# Patient Record
Sex: FEMALE | Race: WHITE | NOT HISPANIC OR LATINO | URBAN - METROPOLITAN AREA
[De-identification: names, ages, dates, MRNs, and addresses within clinical notes are randomized per-mention and may not be internally consistent; named-entity substitution may affect disease eponyms.]

---

## 2017-03-08 PROBLEM — Z00.00 ENCOUNTER FOR PREVENTIVE HEALTH EXAMINATION: Status: ACTIVE | Noted: 2017-03-08

## 2017-09-11 ENCOUNTER — INPATIENT (INPATIENT)
Facility: HOSPITAL | Age: 27
LOS: 1 days | Discharge: HOME | End: 2017-09-13
Attending: OBSTETRICS & GYNECOLOGY | Admitting: OBSTETRICS & GYNECOLOGY

## 2017-09-11 DIAGNOSIS — O13.9 GESTATIONAL [PREGNANCY-INDUCED] HYPERTENSION WITHOUT SIGNIFICANT PROTEINURIA, UNSPECIFIED TRIMESTER: ICD-10-CM

## 2017-09-15 DIAGNOSIS — Z33.1 PREGNANT STATE, INCIDENTAL: ICD-10-CM

## 2017-09-15 DIAGNOSIS — Z3A.40 40 WEEKS GESTATION OF PREGNANCY: ICD-10-CM

## 2019-01-23 ENCOUNTER — APPOINTMENT (OUTPATIENT)
Dept: ANTEPARTUM | Facility: CLINIC | Age: 29
End: 2019-01-23
Payer: MEDICAID

## 2019-01-23 ENCOUNTER — ASOB RESULT (OUTPATIENT)
Age: 29
End: 2019-01-23

## 2019-01-23 PROCEDURE — 76811 OB US DETAILED SNGL FETUS: CPT

## 2019-01-23 PROCEDURE — 76817 TRANSVAGINAL US OBSTETRIC: CPT

## 2019-05-22 ENCOUNTER — OUTPATIENT (OUTPATIENT)
Dept: OUTPATIENT SERVICES | Facility: HOSPITAL | Age: 29
LOS: 1 days | Discharge: HOME | End: 2019-05-22

## 2019-05-22 VITALS — SYSTOLIC BLOOD PRESSURE: 138 MMHG | HEART RATE: 103 BPM | DIASTOLIC BLOOD PRESSURE: 88 MMHG | TEMPERATURE: 97 F

## 2019-05-22 VITALS — DIASTOLIC BLOOD PRESSURE: 72 MMHG | SYSTOLIC BLOOD PRESSURE: 127 MMHG | HEART RATE: 75 BPM

## 2019-05-22 LAB
ALBUMIN SERPL ELPH-MCNC: 3.3 G/DL — LOW (ref 3.5–5.2)
ALP SERPL-CCNC: 130 U/L — HIGH (ref 30–115)
ALT FLD-CCNC: 18 U/L — SIGNIFICANT CHANGE UP (ref 0–41)
ANION GAP SERPL CALC-SCNC: 15 MMOL/L — HIGH (ref 7–14)
ANISOCYTOSIS BLD QL: SLIGHT — SIGNIFICANT CHANGE UP
AST SERPL-CCNC: 18 U/L — SIGNIFICANT CHANGE UP (ref 0–41)
BASOPHILS # BLD AUTO: 0 K/UL — SIGNIFICANT CHANGE UP (ref 0–0.2)
BASOPHILS NFR BLD AUTO: 0 % — SIGNIFICANT CHANGE UP (ref 0–1)
BILIRUB SERPL-MCNC: 0.2 MG/DL — SIGNIFICANT CHANGE UP (ref 0.2–1.2)
BUN SERPL-MCNC: 6 MG/DL — LOW (ref 10–20)
CALCIUM SERPL-MCNC: 9.7 MG/DL — SIGNIFICANT CHANGE UP (ref 8.5–10.1)
CHLORIDE SERPL-SCNC: 102 MMOL/L — SIGNIFICANT CHANGE UP (ref 98–110)
CO2 SERPL-SCNC: 22 MMOL/L — SIGNIFICANT CHANGE UP (ref 17–32)
CREAT ?TM UR-MCNC: 20 MG/DL — SIGNIFICANT CHANGE UP
CREAT SERPL-MCNC: <0.5 MG/DL — LOW (ref 0.7–1.5)
EOSINOPHIL # BLD AUTO: 0 K/UL — SIGNIFICANT CHANGE UP (ref 0–0.7)
EOSINOPHIL NFR BLD AUTO: 0 % — SIGNIFICANT CHANGE UP (ref 0–8)
GIANT PLATELETS BLD QL SMEAR: PRESENT — SIGNIFICANT CHANGE UP
GLUCOSE SERPL-MCNC: 91 MG/DL — SIGNIFICANT CHANGE UP (ref 70–99)
HCT VFR BLD CALC: 32.4 % — LOW (ref 37–47)
HGB BLD-MCNC: 10.2 G/DL — LOW (ref 12–16)
LDH SERPL L TO P-CCNC: 215 — SIGNIFICANT CHANGE UP (ref 50–242)
LYMPHOCYTES # BLD AUTO: 1.21 K/UL — SIGNIFICANT CHANGE UP (ref 1.2–3.4)
LYMPHOCYTES # BLD AUTO: 11.3 % — LOW (ref 20.5–51.1)
MANUAL SMEAR VERIFICATION: SIGNIFICANT CHANGE UP
MCHC RBC-ENTMCNC: 25.8 PG — LOW (ref 27–31)
MCHC RBC-ENTMCNC: 31.5 G/DL — LOW (ref 32–37)
MCV RBC AUTO: 81.8 FL — SIGNIFICANT CHANGE UP (ref 81–99)
METAMYELOCYTES # FLD: 1.7 % — HIGH (ref 0–0)
MICROCYTES BLD QL: SLIGHT — SIGNIFICANT CHANGE UP
MONOCYTES # BLD AUTO: 0.28 K/UL — SIGNIFICANT CHANGE UP (ref 0.1–0.6)
MONOCYTES NFR BLD AUTO: 2.6 % — SIGNIFICANT CHANGE UP (ref 1.7–9.3)
MYELOCYTES NFR BLD: 1.8 % — HIGH (ref 0–0)
NEUTROPHILS # BLD AUTO: 8.5 K/UL — HIGH (ref 1.4–6.5)
NEUTROPHILS NFR BLD AUTO: 79.1 % — HIGH (ref 42.2–75.2)
OVALOCYTES BLD QL SMEAR: SLIGHT — SIGNIFICANT CHANGE UP
PLAT MORPH BLD: NORMAL — SIGNIFICANT CHANGE UP
PLATELET # BLD AUTO: 148 K/UL — SIGNIFICANT CHANGE UP (ref 130–400)
POTASSIUM SERPL-MCNC: 4 MMOL/L — SIGNIFICANT CHANGE UP (ref 3.5–5)
POTASSIUM SERPL-SCNC: 4 MMOL/L — SIGNIFICANT CHANGE UP (ref 3.5–5)
PROT ?TM UR-MCNC: <5 MG/DLG/24H — SIGNIFICANT CHANGE UP
PROT SERPL-MCNC: 5.8 G/DL — LOW (ref 6–8)
PROT/CREAT UR-RTO: <0.2 RATIO — SIGNIFICANT CHANGE UP (ref 0–0.2)
RBC # BLD: 3.96 M/UL — LOW (ref 4.2–5.4)
RBC # FLD: 13.8 % — SIGNIFICANT CHANGE UP (ref 11.5–14.5)
RBC BLD AUTO: NORMAL — SIGNIFICANT CHANGE UP
SODIUM SERPL-SCNC: 139 MMOL/L — SIGNIFICANT CHANGE UP (ref 135–146)
URATE SERPL-MCNC: 3.9 MG/DL — SIGNIFICANT CHANGE UP (ref 2.5–7)
VARIANT LYMPHS # BLD: 3.5 % — SIGNIFICANT CHANGE UP (ref 0–5)
WBC # BLD: 10.75 K/UL — SIGNIFICANT CHANGE UP (ref 4.8–10.8)
WBC # FLD AUTO: 10.75 K/UL — SIGNIFICANT CHANGE UP (ref 4.8–10.8)

## 2019-05-22 NOTE — PROGRESS NOTE ADULT - SUBJECTIVE AND OBJECTIVE BOX
PGY 3 Note:    Patient seen at bedside. Feeling well. Denies ctx, vb, lof. Good FM. Denies headache, vision changes, chest pain, shortness of breath, right upper or epigastric abdominal pain, new swelling.    Physical Exam:     Vital Signs Last 24 Hrs  T(C): 37 (22 May 2019 14:13), Max: 37 (22 May 2019 14:13)  T(F): 98.6 (22 May 2019 14:13), Max: 98.6 (22 May 2019 14:13)  HR: 75 (22 May 2019 21:14) (75 - 121)  BP: 127/72 (22 May 2019 21:14) (115/71 - 142/79)  RR: 18 (22 May 2019 14:13) (18 - 18)    Gen: NAD, A&OX3  Heart: S1S2, RRR  Lungs: CTAB  Abd: gravid, sfot, NT, no palpable ctx, no RUQ/epigastric tenderness, no R/G/R  VE: Deferred  Ext: No edema or calf tenderness  Neuro: DTRs 2+ UE Bilaterally    EFM: 140/mod/+accels  [place]: occasional    Labs:                          10.2   10.75 )-----------( 148      ( 22 May 2019 15:07 )             32.4     05-22    139  |  102  |  6<L>  ----------------------------<  91  4.0   |  22  |  <0.5<L>    Ca    9.7      22 May 2019 15:07    TPro  5.8<L>  /  Alb  3.3<L>  /  TBili  0.2  /  DBili  x   /  AST  18  /  ALT  18  /  AlkPhos  130<H>  05-22    Uric Acid, Serum: 3.9 mg/dL (05.22.19 @ 15:07)    Lactate Dehydrogenase, Serum: 215 (05.22.19 @ 15:07)    UPRCR PENDING

## 2019-05-22 NOTE — OB PROVIDER TRIAGE NOTE - NS_OBGYNHISTORY_OBGYN_ALL_OB_FT
OBhx:     @ 37weeks, F, 5-10lbs--> PP GHTN, on procardia x 2 weeks    @ 40weeks, M 7-11lbs--> PP GHTN, procardia x 2 weeks  Gynhx: denies fibroids, cysts, STIs

## 2019-05-22 NOTE — OB PROVIDER TRIAGE NOTE - HISTORY OF PRESENT ILLNESS
29 yo  @ 37w4d, EDC 2019 by LMP sent in from the office for BP monitoring. BPs in office ranged from 121-150/84-92. Denies headache, blurry vision, chest pain, SOB, RUQ/epigastric pain. h/o PP GHTN after both of her previous pregnancies. Was on procardia for 2 weeks PP after last two pregnancies. BPs went back to normal after 2 weeks and procardia was discontinued. Denies h/o preeclampsia or getting magnesium. Denies ctx, VB, LOF. Good FM. No other issues during this pregnancy.

## 2019-05-22 NOTE — PROGRESS NOTE ADULT - ASSESSMENT
29 yo  @ 37w4d, GBS neg, no criteria for GHTN or preeclampsia met, spontaneous decels x 3 resolved, now Cat 1, reactive tracing, BPP 8/8, not in labor, maternal and fetal wellbeing reassured    - DC home per PMD  - Precautions given  - Uprcr pending, will follow up  - F/u with OBGYN this week      Dr. Mccormick aware 29 yo  @ 37w4d, GBS neg, no criteria for GHTN or preeclampsia met yet, spontaneous decels x 3 resolved, now Cat 1, reactive tracing, BPP 8/8, not in labor, maternal and fetal wellbeing reassured    - DC home per PMD  - Precautions given  - Uprcr pending, will follow up  - F/u with OBGYN this week      Dr. Mccormick aware

## 2019-05-22 NOTE — PROGRESS NOTE ADULT - ASSESSMENT
29 yo  @ 37w4d, GBS neg, no criteria for GHTN or preeclampsia met, spontaneous decels x 2 resolved, reactive tracing, BPP     Will discuss case with attending to decide on continued prolonged monitoring vs possible dc home    Dr. Hinton to be made aware

## 2019-05-22 NOTE — OB PROVIDER TRIAGE NOTE - NSHPPHYSICALEXAM_GEN_ALL_CORE
ICU Vital Signs Last 24 Hrs  T(C): 37 (22 May 2019 14:13), Max: 37 (22 May 2019 14:13)  T(F): 98.6 (22 May 2019 14:13), Max: 98.6 (22 May 2019 14:13)  HR: 86 (22 May 2019 14:51) (86 - 103)  BP: 135/84 (22 May 2019 14:51) (128/79 - 138/88)  BP(mean): --  ABP: --  ABP(mean): --  RR: 18 (22 May 2019 14:13) (18 - 18)  SpO2: --    GEN:NAD  Abd: NT, gravid, no palpable ctx   SVE: deferred, was c/l/p last week  Heart: RRR  Lungs: CTAB  Ext: no calf tenderness or edema b/l  Neuro: 2+ DTR in LE b/l    EFM: 140/mod earle/+accel/1 spontaneous decel x 1 min, recovered spontaneously  TOCO: irritable

## 2019-05-22 NOTE — PROGRESS NOTE ADULT - SUBJECTIVE AND OBJECTIVE BOX
PGY 3 note    Patient seen at bedside for evaluation of 2 spontaneous decels.  No headache, blurry vision, chest pain, SOB, RUQ/epigastric pain. No ctx, VB, LOF. Good FM.    ICU Vital Signs Last 24 Hrs  T(C): 37 (22 May 2019 14:13), Max: 37 (22 May 2019 14:13)  T(F): 98.6 (22 May 2019 14:13), Max: 98.6 (22 May 2019 14:13)  HR: 99 (22 May 2019 17:59) (77 - 103)  BP: 142/76 (22 May 2019 17:59) (124/77 - 142/76)  BP(mean): --  ABP: --  ABP(mean): --  RR: 18 (22 May 2019 14:13) (18 - 18)  SpO2: --    GEN: NAD  Heart: RRR  Lungs: CTAB  Abd: soft, NT, gravid, no palpable ctx, no RUQ/epigastric pain  SVE: deferred  Neuro: 2+ DTR in UE b/l    EFM: 140/mod earle/+accels/2 spontaneous decels at 1600 and 1500, since then tracing is reactive, Cat 1  TOCO q4-6 irreg    labs  5/22 10.75>10.2/32.4<148, 139/4/102/22/6/0.5<91, AST/ALT 18/18, uric acid 3.9,   Upr/cr pending    bedside sonogram:  BPP 8/8, MVP 4cm, cephalic, ant placenta, BPP 8/8, EFW 3349g (65%)

## 2019-05-22 NOTE — OB PROVIDER TRIAGE NOTE - NSOBPROVIDERNOTE_OBGYN_ALL_OB_FT
27yo  @ 37w4d, GBS neg, h/o GHTN in previous pregnancies x 2, with mildy elevated BPs in clinic, r/o GHTN vs preeclampsia, 1 spontaneous decel  f/u PEL  continuous toco./efm  if pt meets criteria or tracing becomes Cat 2 will discuss IOL  f/u BPs q 15  bedrest  regular diet    Dr. Hinton aware

## 2019-05-22 NOTE — OB PROVIDER TRIAGE NOTE - NSHPLABSRESULTS_GEN_ALL_CORE
GCT 88    12w5d +FH  19w: ant placenta, nl anatomy, AGA    A pos  rubella nonimmune  Measles non immune  Varicella immune  HIV NR  12.1/36.6  RPR NR  Heps Ag NR

## 2019-05-31 ENCOUNTER — INPATIENT (INPATIENT)
Facility: HOSPITAL | Age: 29
LOS: 1 days | Discharge: HOME | End: 2019-06-02
Attending: OBSTETRICS & GYNECOLOGY | Admitting: OBSTETRICS & GYNECOLOGY
Payer: MEDICAID

## 2019-05-31 VITALS — SYSTOLIC BLOOD PRESSURE: 135 MMHG | HEART RATE: 105 BPM | DIASTOLIC BLOOD PRESSURE: 76 MMHG

## 2019-05-31 PROBLEM — Z78.9 OTHER SPECIFIED HEALTH STATUS: Chronic | Status: ACTIVE | Noted: 2019-05-22

## 2019-05-31 LAB
AMPHET UR-MCNC: NEGATIVE — SIGNIFICANT CHANGE UP
APPEARANCE UR: ABNORMAL
BACTERIA # UR AUTO: ABNORMAL /HPF
BARBITURATES UR SCN-MCNC: NEGATIVE — SIGNIFICANT CHANGE UP
BASOPHILS # BLD AUTO: 0.03 K/UL — SIGNIFICANT CHANGE UP (ref 0–0.2)
BASOPHILS NFR BLD AUTO: 0.3 % — SIGNIFICANT CHANGE UP (ref 0–1)
BENZODIAZ UR-MCNC: NEGATIVE — SIGNIFICANT CHANGE UP
BILIRUB UR-MCNC: NEGATIVE — SIGNIFICANT CHANGE UP
BLD GP AB SCN SERPL QL: SIGNIFICANT CHANGE UP
BUPRENORPHINE SCREEN, URINE RESULT: POSITIVE
COCAINE METAB.OTHER UR-MCNC: NEGATIVE — SIGNIFICANT CHANGE UP
COLOR SPEC: YELLOW — SIGNIFICANT CHANGE UP
DIFF PNL FLD: NEGATIVE — SIGNIFICANT CHANGE UP
EOSINOPHIL # BLD AUTO: 0.02 K/UL — SIGNIFICANT CHANGE UP (ref 0–0.7)
EOSINOPHIL NFR BLD AUTO: 0.2 % — SIGNIFICANT CHANGE UP (ref 0–8)
EPI CELLS # UR: ABNORMAL /HPF
GLUCOSE UR QL: NEGATIVE MG/DL — SIGNIFICANT CHANGE UP
HCT VFR BLD CALC: 33.7 % — LOW (ref 37–47)
HGB BLD-MCNC: 10.9 G/DL — LOW (ref 12–16)
IMM GRANULOCYTES NFR BLD AUTO: 1.8 % — HIGH (ref 0.1–0.3)
KETONES UR-MCNC: NEGATIVE — SIGNIFICANT CHANGE UP
L&D DRUG SCREEN, URINE: SIGNIFICANT CHANGE UP
LEUKOCYTE ESTERASE UR-ACNC: ABNORMAL
LYMPHOCYTES # BLD AUTO: 1.49 K/UL — SIGNIFICANT CHANGE UP (ref 1.2–3.4)
LYMPHOCYTES # BLD AUTO: 13 % — LOW (ref 20.5–51.1)
MCHC RBC-ENTMCNC: 26.5 PG — LOW (ref 27–31)
MCHC RBC-ENTMCNC: 32.3 G/DL — SIGNIFICANT CHANGE UP (ref 32–37)
MCV RBC AUTO: 82 FL — SIGNIFICANT CHANGE UP (ref 81–99)
METHADONE UR-MCNC: NEGATIVE — SIGNIFICANT CHANGE UP
MONOCYTES # BLD AUTO: 0.62 K/UL — HIGH (ref 0.1–0.6)
MONOCYTES NFR BLD AUTO: 5.4 % — SIGNIFICANT CHANGE UP (ref 1.7–9.3)
NEUTROPHILS # BLD AUTO: 9.05 K/UL — HIGH (ref 1.4–6.5)
NEUTROPHILS NFR BLD AUTO: 79.3 % — HIGH (ref 42.2–75.2)
NITRITE UR-MCNC: NEGATIVE — SIGNIFICANT CHANGE UP
NRBC # BLD: 0 /100 WBCS — SIGNIFICANT CHANGE UP (ref 0–0)
OPIATES UR-MCNC: NEGATIVE — SIGNIFICANT CHANGE UP
OXYCODONE UR-MCNC: NEGATIVE — SIGNIFICANT CHANGE UP
PCP UR-MCNC: NEGATIVE — SIGNIFICANT CHANGE UP
PH UR: 8.5 — SIGNIFICANT CHANGE UP (ref 5–8)
PLATELET # BLD AUTO: 116 K/UL — LOW (ref 130–400)
PRENATAL SYPHILIS TEST: SIGNIFICANT CHANGE UP
PROPOXYPHENE QUALITATIVE URINE RESULT: NEGATIVE — SIGNIFICANT CHANGE UP
PROT UR-MCNC: 30 MG/DL
RBC # BLD: 4.11 M/UL — LOW (ref 4.2–5.4)
RBC # FLD: 14.6 % — HIGH (ref 11.5–14.5)
SP GR SPEC: 1.02 — SIGNIFICANT CHANGE UP (ref 1.01–1.03)
UROBILINOGEN FLD QL: 0.2 MG/DL — SIGNIFICANT CHANGE UP (ref 0.2–0.2)
WBC # BLD: 11.42 K/UL — HIGH (ref 4.8–10.8)
WBC # FLD AUTO: 11.42 K/UL — HIGH (ref 4.8–10.8)

## 2019-05-31 PROCEDURE — 59400 OBSTETRICAL CARE: CPT | Mod: U7

## 2019-05-31 RX ORDER — DIPHENHYDRAMINE HCL 50 MG
25 CAPSULE ORAL EVERY 6 HOURS
Refills: 0 | Status: DISCONTINUED | OUTPATIENT
Start: 2019-05-31 | End: 2019-06-02

## 2019-05-31 RX ORDER — OXYTOCIN 10 UNIT/ML
41.67 VIAL (ML) INJECTION
Qty: 20 | Refills: 0 | Status: DISCONTINUED | OUTPATIENT
Start: 2019-05-31 | End: 2019-05-31

## 2019-05-31 RX ORDER — PRAMOXINE HYDROCHLORIDE 150 MG/15G
1 AEROSOL, FOAM RECTAL EVERY 4 HOURS
Refills: 0 | Status: DISCONTINUED | OUTPATIENT
Start: 2019-05-31 | End: 2019-06-02

## 2019-05-31 RX ORDER — OXYCODONE HYDROCHLORIDE 5 MG/1
5 TABLET ORAL ONCE
Refills: 0 | Status: DISCONTINUED | OUTPATIENT
Start: 2019-05-31 | End: 2019-06-02

## 2019-05-31 RX ORDER — IBUPROFEN 200 MG
600 TABLET ORAL EVERY 6 HOURS
Refills: 0 | Status: DISCONTINUED | OUTPATIENT
Start: 2019-05-31 | End: 2019-06-02

## 2019-05-31 RX ORDER — LANOLIN
1 OINTMENT (GRAM) TOPICAL EVERY 6 HOURS
Refills: 0 | Status: DISCONTINUED | OUTPATIENT
Start: 2019-05-31 | End: 2019-06-02

## 2019-05-31 RX ORDER — OXYCODONE HYDROCHLORIDE 5 MG/1
5 TABLET ORAL
Refills: 0 | Status: DISCONTINUED | OUTPATIENT
Start: 2019-05-31 | End: 2019-06-02

## 2019-05-31 RX ORDER — BENZOCAINE 10 %
1 GEL (GRAM) MUCOUS MEMBRANE EVERY 6 HOURS
Refills: 0 | Status: DISCONTINUED | OUTPATIENT
Start: 2019-05-31 | End: 2019-06-02

## 2019-05-31 RX ORDER — IBUPROFEN 200 MG
600 TABLET ORAL EVERY 6 HOURS
Refills: 0 | Status: COMPLETED | OUTPATIENT
Start: 2019-05-31 | End: 2020-04-28

## 2019-05-31 RX ORDER — SODIUM CHLORIDE 9 MG/ML
1000 INJECTION, SOLUTION INTRAVENOUS
Refills: 0 | Status: DISCONTINUED | OUTPATIENT
Start: 2019-05-31 | End: 2019-05-31

## 2019-05-31 RX ORDER — SIMETHICONE 80 MG/1
80 TABLET, CHEWABLE ORAL EVERY 4 HOURS
Refills: 0 | Status: DISCONTINUED | OUTPATIENT
Start: 2019-05-31 | End: 2019-06-02

## 2019-05-31 RX ORDER — SODIUM CHLORIDE 9 MG/ML
3 INJECTION INTRAMUSCULAR; INTRAVENOUS; SUBCUTANEOUS EVERY 8 HOURS
Refills: 0 | Status: DISCONTINUED | OUTPATIENT
Start: 2019-05-31 | End: 2019-06-01

## 2019-05-31 RX ORDER — AER TRAVELER 0.5 G/1
1 SOLUTION RECTAL; TOPICAL EVERY 4 HOURS
Refills: 0 | Status: DISCONTINUED | OUTPATIENT
Start: 2019-05-31 | End: 2019-06-02

## 2019-05-31 RX ORDER — HYDROCORTISONE 1 %
1 OINTMENT (GRAM) TOPICAL EVERY 6 HOURS
Refills: 0 | Status: DISCONTINUED | OUTPATIENT
Start: 2019-05-31 | End: 2019-06-02

## 2019-05-31 RX ORDER — KETOROLAC TROMETHAMINE 30 MG/ML
30 SYRINGE (ML) INJECTION ONCE
Refills: 0 | Status: DISCONTINUED | OUTPATIENT
Start: 2019-05-31 | End: 2019-05-31

## 2019-05-31 RX ORDER — GLYCERIN ADULT
1 SUPPOSITORY, RECTAL RECTAL AT BEDTIME
Refills: 0 | Status: DISCONTINUED | OUTPATIENT
Start: 2019-05-31 | End: 2019-06-02

## 2019-05-31 RX ORDER — ACETAMINOPHEN 500 MG
975 TABLET ORAL EVERY 6 HOURS
Refills: 0 | Status: DISCONTINUED | OUTPATIENT
Start: 2019-05-31 | End: 2019-06-02

## 2019-05-31 RX ORDER — MAGNESIUM HYDROXIDE 400 MG/1
30 TABLET, CHEWABLE ORAL
Refills: 0 | Status: DISCONTINUED | OUTPATIENT
Start: 2019-05-31 | End: 2019-06-02

## 2019-05-31 RX ORDER — DOCUSATE SODIUM 100 MG
100 CAPSULE ORAL
Refills: 0 | Status: DISCONTINUED | OUTPATIENT
Start: 2019-05-31 | End: 2019-06-02

## 2019-05-31 RX ORDER — DIBUCAINE 1 %
1 OINTMENT (GRAM) RECTAL EVERY 6 HOURS
Refills: 0 | Status: DISCONTINUED | OUTPATIENT
Start: 2019-05-31 | End: 2019-06-02

## 2019-05-31 RX ADMIN — SODIUM CHLORIDE 3 MILLILITER(S): 9 INJECTION INTRAMUSCULAR; INTRAVENOUS; SUBCUTANEOUS at 20:52

## 2019-05-31 RX ADMIN — Medication 30 MILLIGRAM(S): at 11:22

## 2019-05-31 RX ADMIN — Medication 600 MILLIGRAM(S): at 19:22

## 2019-05-31 NOTE — OB PROVIDER DELIVERY SUMMARY - NSPROVIDERDELIVERYNOTE_OBGYN_ALL_OB_FT
pt delivered a viable male infant ove 1st degree laceration  placenta delivered intact and spont   pt stable   mild lochia   uterus firm   laceration repaired with 2-0 chromic suture   baby to reg nursery.

## 2019-05-31 NOTE — OB PROVIDER H&P - ASSESSMENT
29 yo  @39w1d, GBS neg, w/ gestational thrombocytopenia, in active labor.    - admit to L&D  - pain management PRN  - clear liquid diet, IV hydration  - monitor vitals, bleeding  - cont EFM and toco  - f/up admission labs    Dr. Gonzalez and Dr. iHnton aware.

## 2019-05-31 NOTE — OB PROVIDER H&P - NS_OBGYNHISTORY_OBGYN_ALL_OB_FT
Ob hx: , FT NSVDx2 largest [id number], h/o gHTN  (diagnosed intrapartum both times) took procardia PP in both pregnancies, no IVP meds no Mg    Gyn hx: denies h/o cysts, fibroids, abnormal paps, STIs

## 2019-05-31 NOTE — OB PROVIDER H&P - NSHPPHYSICALEXAM_GEN_ALL_CORE
Vital Signs Last 24 Hrs  T(C): 36.8 (31 May 2019 10:05), Max: 36.8 (31 May 2019 10:05)  T(F): 98.3 (31 May 2019 10:05), Max: 98.3 (31 May 2019 10:05)  HR: 100 (31 May 2019 10:23) (100 - 105)  BP: 130/77 (31 May 2019 10:23) (130/77 - 135/76)  BP(mean): --  RR: 18 (31 May 2019 10:05) (18 - 18)  SpO2: -- Vital Signs Last 24 Hrs  T(F): 98.3 (31 May 2019 10:05), Max: 98.3 (31 May 2019 10:05)  HR: 100 (31 May 2019 10:23) (100 - 105)  BP: 130/77 (31 May 2019 10:23) (130/77 - 135/76)  RR: 18 (31 May 2019 10:05) (18 - 18)    gen: AAOx3, nad  EFM: 130/mod earle/+accel  toco: q2-3min  SVE: 6/100/0, vtx, intact Vital Signs Last 24 Hrs  T(F): 98.3 (31 May 2019 10:05), Max: 98.3 (31 May 2019 10:05)  HR: 100 (31 May 2019 10:23) (100 - 105)  BP: 130/77 (31 May 2019 10:23) (130/77 - 135/76)  RR: 18 (31 May 2019 10:05) (18 - 18)    gen: AAOx3, nad  EFM: 130/mod earle/+accel  toco: q2-3min  SVE: 6/100/0, vtx, intact  abd: soft, nontender, gravid, palpable contractions

## 2019-06-01 ENCOUNTER — TRANSCRIPTION ENCOUNTER (OUTPATIENT)
Age: 29
End: 2019-06-01

## 2019-06-01 VITALS
HEART RATE: 63 BPM | RESPIRATION RATE: 18 BRPM | TEMPERATURE: 98 F | DIASTOLIC BLOOD PRESSURE: 85 MMHG | SYSTOLIC BLOOD PRESSURE: 137 MMHG

## 2019-06-01 LAB
BASOPHILS # BLD AUTO: 0.02 K/UL — SIGNIFICANT CHANGE UP (ref 0–0.2)
BASOPHILS NFR BLD AUTO: 0.2 % — SIGNIFICANT CHANGE UP (ref 0–1)
EOSINOPHIL # BLD AUTO: 0.02 K/UL — SIGNIFICANT CHANGE UP (ref 0–0.7)
EOSINOPHIL NFR BLD AUTO: 0.2 % — SIGNIFICANT CHANGE UP (ref 0–8)
HCT VFR BLD CALC: 29.9 % — LOW (ref 37–47)
HGB BLD-MCNC: 9.4 G/DL — LOW (ref 12–16)
IMM GRANULOCYTES NFR BLD AUTO: 1.8 % — HIGH (ref 0.1–0.3)
LYMPHOCYTES # BLD AUTO: 1.61 K/UL — SIGNIFICANT CHANGE UP (ref 1.2–3.4)
LYMPHOCYTES # BLD AUTO: 13.8 % — LOW (ref 20.5–51.1)
MCHC RBC-ENTMCNC: 26.1 PG — LOW (ref 27–31)
MCHC RBC-ENTMCNC: 31.4 G/DL — LOW (ref 32–37)
MCV RBC AUTO: 83.1 FL — SIGNIFICANT CHANGE UP (ref 81–99)
MONOCYTES # BLD AUTO: 0.73 K/UL — HIGH (ref 0.1–0.6)
MONOCYTES NFR BLD AUTO: 6.3 % — SIGNIFICANT CHANGE UP (ref 1.7–9.3)
NEUTROPHILS # BLD AUTO: 9.08 K/UL — HIGH (ref 1.4–6.5)
NEUTROPHILS NFR BLD AUTO: 77.7 % — HIGH (ref 42.2–75.2)
NRBC # BLD: 0 /100 WBCS — SIGNIFICANT CHANGE UP (ref 0–0)
PLATELET # BLD AUTO: 101 K/UL — LOW (ref 130–400)
RBC # BLD: 3.6 M/UL — LOW (ref 4.2–5.4)
RBC # FLD: 14.7 % — HIGH (ref 11.5–14.5)
WBC # BLD: 11.67 K/UL — HIGH (ref 4.8–10.8)
WBC # FLD AUTO: 11.67 K/UL — HIGH (ref 4.8–10.8)

## 2019-06-01 RX ORDER — IBUPROFEN 200 MG
1 TABLET ORAL
Qty: 0 | Refills: 0 | DISCHARGE
Start: 2019-06-01

## 2019-06-01 RX ORDER — DOCUSATE SODIUM 100 MG
1 CAPSULE ORAL
Qty: 0 | Refills: 0 | DISCHARGE
Start: 2019-06-01

## 2019-06-01 RX ORDER — ACETAMINOPHEN 500 MG
3 TABLET ORAL
Qty: 0 | Refills: 0 | DISCHARGE
Start: 2019-06-01

## 2019-06-01 RX ADMIN — Medication 600 MILLIGRAM(S): at 17:51

## 2019-06-01 RX ADMIN — Medication 600 MILLIGRAM(S): at 12:12

## 2019-06-01 RX ADMIN — Medication 600 MILLIGRAM(S): at 06:54

## 2019-06-01 RX ADMIN — Medication 975 MILLIGRAM(S): at 15:10

## 2019-06-01 RX ADMIN — SODIUM CHLORIDE 3 MILLILITER(S): 9 INJECTION INTRAMUSCULAR; INTRAVENOUS; SUBCUTANEOUS at 06:17

## 2019-06-01 RX ADMIN — Medication 600 MILLIGRAM(S): at 01:16

## 2019-06-01 RX ADMIN — Medication 975 MILLIGRAM(S): at 02:32

## 2019-06-01 RX ADMIN — Medication 600 MILLIGRAM(S): at 07:24

## 2019-06-01 RX ADMIN — Medication 975 MILLIGRAM(S): at 10:10

## 2019-06-01 NOTE — DISCHARGE NOTE OB - REASON FOR REFUSAL
pt will follow up with her healthcare provider pt said she will follow up with her healthcare provider

## 2019-06-01 NOTE — PROGRESS NOTE ADULT - SUBJECTIVE AND OBJECTIVE BOX
Subjective:   Patient doing well. No complaints. Minimal lochia. Pain controlled.    Objective:   T(F): 98.3 ( @ 08:16), Max: 98.8 ( @ 15:26)  HR: 63 ( @ 08:16)  BP: 137/85 ( @ 08:16) (114/66 - 139/94)  RR: 18 ( @ 08:16)  SpO2: --  Gen: AAOx3, NAD  Abd: Soft, Nontender, Nondistended, Fundus firm below the umbilicus  Ext: no tender, mild edema  Min Lochia Rubra    Labs:                        9.4    11.67 )-----------( 101      ( 2019 07:49 )             29.9             Tolerating regular diet  Passed flatus, passed bowel movement  Breast/Bottle feeding    Assessment:   28y s/p , PPD#1, doing well    Plan:  -Routine postpartum care  -Encouraged ambulation and PO hydration  -Tolerating regular diet

## 2019-06-01 NOTE — DISCHARGE NOTE OB - HOSPITAL COURSE
HPI:  29 yo  @39w1d w/ EDC 19 by LMP and c/w 1st tri sono presents to L&D with contractions since 0800, q5-6min, 10/10 intensity. Denies LOF, VB. Reports good fetal movements. H/o gHTN in prior 2 pregnancies, took procardia beginning after delivery for 6wks PP. Pt had decreased platelets at last visit, 105. Otherwise no complications during this pregnancy. GBS neg. (31 May 2019 10:20)  Pt underwent an uncomplicated vaginal delivery with an uncomplicated postpartum course. She was discharged on PPD1 at her request

## 2019-06-01 NOTE — DISCHARGE NOTE OB - PATIENT PORTAL LINK FT
You can access the [facility], offered by [facility], by registering with the following website: [email or web address]

## 2019-06-05 DIAGNOSIS — Z3A.39 39 WEEKS GESTATION OF PREGNANCY: ICD-10-CM

## 2019-06-05 DIAGNOSIS — D69.6 THROMBOCYTOPENIA, UNSPECIFIED: ICD-10-CM

## 2020-11-16 NOTE — OB RN PATIENT PROFILE - PRO FEEDING PLAN INFANT OB
Initial liver disease    Referring MD:  Ludmila Mitchell    DX:  Hepatitis C    Insurance in epic    Records in epic    Initial consultation scheduled with Dr. Leslee Vicente on 1/13/21       breastfeeding exclusively breast and formula feeding

## 2022-09-03 NOTE — OB RN PATIENT PROFILE - NAME OF FATHER, OB PROFILE
Alert-The patient is alert, awake and responds to voice. The patient is oriented to time, place, and person. The triage nurse is able to obtain subjective information. Danielle Daugherty